# Patient Record
Sex: MALE | Race: WHITE | Employment: OTHER | URBAN - NONMETROPOLITAN AREA
[De-identification: names, ages, dates, MRNs, and addresses within clinical notes are randomized per-mention and may not be internally consistent; named-entity substitution may affect disease eponyms.]

---

## 2019-04-09 ENCOUNTER — OFFICE VISIT (OUTPATIENT)
Dept: FAMILY MEDICINE | Facility: OTHER | Age: 64
End: 2019-04-09
Payer: COMMERCIAL

## 2019-04-09 VITALS
BODY MASS INDEX: 46.03 KG/M2 | HEIGHT: 64 IN | DIASTOLIC BLOOD PRESSURE: 92 MMHG | SYSTOLIC BLOOD PRESSURE: 144 MMHG | RESPIRATION RATE: 20 BRPM | OXYGEN SATURATION: 99 % | TEMPERATURE: 96 F | WEIGHT: 269.6 LBS | HEART RATE: 80 BPM

## 2019-04-09 DIAGNOSIS — J30.1 SEASONAL ALLERGIC RHINITIS DUE TO POLLEN: Primary | ICD-10-CM

## 2019-04-09 PROCEDURE — 99203 OFFICE O/P NEW LOW 30 MIN: CPT | Performed by: PHYSICIAN ASSISTANT

## 2019-04-09 RX ORDER — CETIRIZINE HYDROCHLORIDE 10 MG/1
10 TABLET ORAL DAILY
Qty: 30 TABLET | Refills: 3 | Status: SHIPPED | OUTPATIENT
Start: 2019-04-09 | End: 2019-08-09

## 2019-04-09 RX ORDER — DIPHENHYDRAMINE HCL 25 MG
TABLET ORAL
COMMUNITY

## 2019-04-09 RX ORDER — FLUTICASONE PROPIONATE 50 MCG
2 SPRAY, SUSPENSION (ML) NASAL DAILY
COMMUNITY

## 2019-04-09 ASSESSMENT — MIFFLIN-ST. JEOR: SCORE: 1920.96

## 2019-04-09 ASSESSMENT — PAIN SCALES - GENERAL: PAINLEVEL: NO PAIN (0)

## 2019-04-09 NOTE — PROGRESS NOTES
"  SUBJECTIVE:                                                    Jc Rothman is a 63 year old male who presents to clinic today for the following health issues:        ALLERGIES     Onset: years    Description:   Nasal congestion: no  Sneezing: no  Red, itchy eyes: YES    Progression of Symptoms:  worse    Accompanying Signs & Symptoms:  Cough: no  Wheezing: no  Rash: no  Sinus/facial pain: no   History:   Is it seasonal: in the spring   History of Asthma: no  Has allergy testing been done: no    Precipitating factors:   None    Alleviating factors:  None       Therapies Tried and outcome: Flonase, benadryl, no relief    Patient is a 63 year old male who presents today with concerns about allergies. He is new to the clinic, normally receiving his care through [facility]. He says that he feels like the provider he was seeing there did not know what they were talking about. He has since stopped all his medications as he feels the cost is too high. Patient had been prescribed flonase for allergies. Encouraged regular and consistent use of this product. He was under the impression that we would be administering a shot today. I explained to him that allergy immunotherapy is an option, but would require allergist consult as well as sensitivity testing before beginning any such treatments. He is not interested in this at this time.     Problem list and histories reviewed & adjusted, as indicated.  Additional history: as documented    ROS:  Constitutional, HEENT, cardiovascular, pulmonary, gi and gu systems are negative, except as otherwise noted.    OBJECTIVE:     BP (!) 144/92 (BP Location: Right arm, Patient Position: Chair, Cuff Size: Adult Large)   Pulse 80   Temp 96  F (35.6  C) (Oral)   Resp 20   Ht 1.613 m (5' 3.5\")   Wt 122.3 kg (269 lb 9.6 oz)   SpO2 99%   BMI 47.01 kg/m    Body mass index is 47.01 kg/m .  GENERAL: healthy, alert and no distress  RESP: lungs clear to auscultation - no rales, rhonchi or " wheezes  CV: regular rate and rhythm, normal S1 S2, no S3 or S4, no murmur, click or rub, no peripheral edema and peripheral pulses strong  MS: no gross musculoskeletal defects noted, no edema, relies on cane for ambulation  PSYCH: mentation appears normal and agitated         ASSESSMENT/PLAN:     1. Seasonal allergic rhinitis due to pollen  Patient will start daily antihistamine and begin environmental controls in his home to reduce impact of allergies. Continue flonase daily. Reviewed educational information and sent copy home with patient.   - cetirizine (ZYRTEC) 10 MG tablet; Take 1 tablet (10 mg) by mouth daily  Dispense: 30 tablet; Refill: 3  - ranitidine (ZANTAC) 150 MG tablet; Take 1 tablet (150 mg) by mouth 2 times daily as needed (heartburn or allergies)  Dispense: 60 tablet; Refill: 3    Follow up with clinic as needed or sooner if conditions change, worsen or fail to improve as expected.      James Howard PA-C  [facility]

## 2019-04-09 NOTE — NURSING NOTE
Health Maintenance Due   Topic Date Due     PHQ-2 Q1 YR  08/20/1967     HIV SCREEN (SYSTEM ASSIGNED)  08/20/1973     HEPATITIS C SCREENING  08/20/1973     DTAP/TDAP/TD IMMUNIZATION (1 - Tdap) 08/20/1980     LIPID SCREEN Q5 YR MALE (SYSTEM ASSIGNED)  08/20/1990     COLON CANCER SCREEN (SYSTEM ASSIGNED)  08/20/2005     ZOSTER IMMUNIZATION (1 of 2) 08/20/2005     ADVANCE DIRECTIVE PLANNING Q5 YRS  08/20/2010     INFLUENZA VACCINE (1) 09/01/2018   Susy TORRES LPN

## 2019-04-09 NOTE — PATIENT INSTRUCTIONS
1. Cetirizine 10mg daily for allergies  2. Continue the nasal 1 spray each nostril daily  3. If allergies are not well controlled with the two medications above. May add in Zantac (ranitadine) 150mg up to twice a day.       Patient Education     Controlling Allergens: In the Home  Even a clean home can be full of allergens, so take a moment to see what you can do to cut down on allergens in each room of your home. Try to avoid things like cigarette smoke and perfume. They can irritate your eyes, nose, throat, and lungs and make your allergies worse.    Buy an air purifier with a HEPA filter. Look in consumer magazines for recommendations. Avoid vaporizers and humidifiers, since they encourage mold and dust-mite growth.    Use shades or vertical blinds instead of horizontal blinds, which collect dust. Replace drapes with curtains that can be washed regularly.    Enclose mattresses, box springs, and pillows in allergy-proof casings. Use washable blankets and quilts. Avoid feather pillows, down comforters, and wool blankets.    Avoid dust-catching clutter. Have enclosed places to keep books, toys, and clothes. Keep closet doors closed.    Use washable throw rugs wherever possible, or have bare floors.    Put filters over forced-air heating vents. Change the filters regularly.    Keep your car clean. Vacuum the seats and carpets regularly. If you have air conditioning, use it instead of opening the windows.    Keep rain gutters clean. Remove leaves and debris that can grow mold.    Check stored food for spoilage and mold growth. Clean up spills right away.    Don't let wet clothing sit and grow mold. And don't hang clothes outside to dry where they can collect airborne pollen. Dry clothing immediately in a clothes dryer that's vented to the outside.    Install a fan to keep the bathroom well ventilated.        Avoid yard work and pulling weeds. These and other outdoor activities increase your exposure to pollen. If  that s not possible, wear a filter mask. When you re done, bathe, wash your hair, and change your clothes.   Date Last Reviewed: 9/1/2016 2000-2018 [employer]. [address] [place] [address], [place] [address]. All rights reserved. This information is not intended as a substitute for professional medical care. Always follow your healthcare professional's instructions.           Patient Education     Understanding Nasal Allergies  Nasal allergies (also called allergic rhinitis) are a common health problem. They may be seasonal. This means they cause symptoms only at certain times of the year. Or they may be perennial. This means they cause symptoms all year long. Other health problems, such as asthma, often occur along with allergies as well.    What is an allergic reaction?  An allergy is a reaction to a substance called an allergen. Common allergens include:    Wind-borne pollen    Mold    Dust mites    Furry and feathered animals    Cockroaches  Normally, allergens are harmless. But when a person has allergies, the body thinks they are harmful. The body then attacks allergens with antibodies. Antibodies are attached to special cells called mast cells. Allergens stick to the antibodies. This makes the mast cells release histamine and other chemicals. This is an allergic reaction. The chemicals irritate nearby nasal tissue. This causes nasal allergy symptoms.  Common nasal allergy symptoms  Allergies can cause nasal tissue to swell. This makes the air passages smaller. The nose may feel stuffed up. The nose may also make extra mucus, which can plug the nasal passages or drip out of the nose. Mucus can drip down the back of the throat (postnasal drip) as well. Sinus tissue can swell. This may cause pain and headache. Common allergy symptoms include:    Runny nose with clear, watery discharge    Stuffy nose (nasal congestion)    Drainage down your throat (postnasal drip)    Sneezing    Red, watery  eyes    Itchy nose, eyes, ears, and throat    Plugged-up ears (ear congestion)    Sore throat    Coughing    Sinus pain and swelling    Headache  It may not be allergies  Other health problems can cause symptoms like those of nasal allergies. These include:    Nonallergic rhinitis and viruses such as colds    Irritants and pollutants, such as strong odors or smoke    Certain medicines    Changes in the weather   Treatment  Your healthcare provider will evaluate you to find the cause of your symptoms then recommend treatment. If your symptoms are due to nasal allergies, your healthcare provider may prescribe nasal steroid sprays or oral antihistamines to help reduce symptoms. Avoidance of the allergen will also be suggested. You may also be referred to an allergist.   Date Last Reviewed: 10/1/2016    4144-7810 [employer]. [address] [place] [address], [place] [address]. All rights reserved. This information is not intended as a substitute for professional medical care. Always follow your healthcare professional's instructions.

## 2019-05-29 ENCOUNTER — OFFICE VISIT (OUTPATIENT)
Dept: FAMILY MEDICINE | Facility: OTHER | Age: 64
End: 2019-05-29
Payer: COMMERCIAL

## 2019-05-29 VITALS
BODY MASS INDEX: 51.4 KG/M2 | TEMPERATURE: 97.5 F | OXYGEN SATURATION: 92 % | WEIGHT: 294.8 LBS | DIASTOLIC BLOOD PRESSURE: 84 MMHG | HEART RATE: 84 BPM | RESPIRATION RATE: 20 BRPM | SYSTOLIC BLOOD PRESSURE: 138 MMHG

## 2019-05-29 DIAGNOSIS — L03.119 CELLULITIS OF LOWER EXTREMITY, UNSPECIFIED LATERALITY: Primary | ICD-10-CM

## 2019-05-29 DIAGNOSIS — R60.9 DEPENDENT EDEMA: ICD-10-CM

## 2019-05-29 DIAGNOSIS — E66.01 MORBID OBESITY (H): ICD-10-CM

## 2019-05-29 PROCEDURE — 99213 OFFICE O/P EST LOW 20 MIN: CPT | Performed by: PHYSICIAN ASSISTANT

## 2019-05-29 RX ORDER — FUROSEMIDE 20 MG
20 TABLET ORAL DAILY
Qty: 30 TABLET | Refills: 0 | Status: SHIPPED | OUTPATIENT
Start: 2019-05-29

## 2019-05-29 RX ORDER — CEPHALEXIN 500 MG/1
500 CAPSULE ORAL 2 TIMES DAILY
Qty: 20 CAPSULE | Refills: 0 | Status: SHIPPED | OUTPATIENT
Start: 2019-05-29 | End: 2019-06-08

## 2019-05-29 ASSESSMENT — PAIN SCALES - GENERAL: PAINLEVEL: WORST PAIN (10)

## 2019-05-29 NOTE — NURSING NOTE
Health Maintenance Due   Topic Date Due     HEPATITIS C SCREENING  1955     ADVANCED DIRECTIVE PLANNING  1955     COLONOSCOPY  08/20/1965     HIV SCREENING  08/20/1970     DTAP/TDAP/TD IMMUNIZATION (1 - Tdap) 08/20/1980     LIPID  08/20/1990     ZOSTER IMMUNIZATION (2 of 3) 09/09/2013     Susy TORRES LPN

## 2019-05-29 NOTE — PATIENT INSTRUCTIONS
Patient Education     Leg Swelling in Both Legs    Swelling of the feet, ankles, and legs is called edema. It is caused by excess fluid that has collected in the tissues. Extra fluid in the body settles in the lowest part because of gravity. This is why the legs and feet are most affected.  Some of the causes for edema include:    Disease of the heart like congestive heart failure    Standing or sitting for long periods of time    Infection of the feet or legs    Blood pooling in the veins of your legs (venous insufficiency)    Dilated veins in your lower leg (varicose veins)    Garters or other clothing that is tight on your legs. This will cause blood to pool in your legs because the clothing limits blood flow.    Some medicines such as hormones like birth control pills, some blood pressure medicines like calcium channel blockers (amlodipine) and steroids, some antidepressants like MAO inhibitors and tricyclics    Menstrual periods that cause you to retain fluids    Many types of renal disease    Liver failure or cirrhosis    Pregnancy, some swelling is normal, but a sudden increase in leg swelling or weight gain can be a sign of a dangerous complication of pregnancy    Poor nutrition    Thyroid disease  Medical treatment will depend on what is causing the swelling in your legs. Your healthcare provider may prescribe water pills (diuretics) to get rid of the extra fluid.  Home care  Follow these guidelines when caring for yourself at home:    Don't wear clothing like garters that is tight on your legs.    Keep your legs up while lying or sitting.    If infection, injury, or recent surgery is causing the swelling, stay off your legs as much as possible until symptoms get better.    If your healthcare provider says that your leg swelling is caused by venous insufficiency or varicose veins, don't sit or  one place for long periods of time. Take breaks and walk about every few hours. Brisk walking is a good  exercise. It helps circulate the blood that has collected in your leg. Talk with your provider about using support stockings to stop daytime leg swelling.    If your provider says that heart disease is causing your leg swelling, follow a low-salt diet to stop extra fluid from staying in your body. You may also need medicine.  Follow-up care  Follow up with your healthcare provider, or as advised.  When to seek medical advice  Call your healthcare provider right away if any of these occur:    New shortness of breath or chest pain    Shortness of breath or chest pain that gets worse    Swelling in both legs or ankles that gets worse    Swelling of the abdomen    Redness, warmth, or swelling in one leg    Fever of 100.4 F (38 C) or higher, or as directed by your healthcare provider    Yellow color to your skin or eyes    Rapid, unexplained weight gain    Having to sleep upright or use an increased number of pillows  Date Last Reviewed: 3/31/2016    6588-1093 [employer].. [address] [place] [address], [place] [address]. All rights reserved. This information is not intended as a substitute for professional medical care. Always follow your healthcare professional's instructions.

## 2019-05-29 NOTE — PROGRESS NOTES
Subjective     Jc Rothman is a 63 year old male who presents to clinic today for the following health issues:    HPI   Concern - rash on bilateral lower leg  Onset: 5 years    Description:   rash    Intensity: severe    Progression of Symptoms:  same    Accompanying Signs & Symptoms:  itching    Previous history of similar problem:   YES    Precipitating factors:   Worsened by: nothing    Alleviating factors:  Improved by: nothing    Therapies Tried and outcome: Triamcinolone, alcohol, peroxide, no relief    Patient is a 63 year old male who presents with ongoing concerns about lower extremity edema, itching and pain. Patient receives majority of his care through [facility], but says that the providers are not helpful there. He is obese and his legs are quite edematous with nodular growths. There is visible weeping and red color noted throughout. Patient is unsure as to whether elevation helps and has not been using compression stockings in some time. Consulted with IM on whether dermatologist consult would be beneficial.     Reviewed and updated as needed this visit by Provider         Review of Systems   ROS COMP: Constitutional, HEENT, cardiovascular, pulmonary, gi and gu systems are negative, except as otherwise noted.      Objective    /84 (BP Location: Right arm, Patient Position: Chair, Cuff Size: Adult Large)   Pulse 84   Temp 97.5  F (36.4  C) (Oral)   Resp 20   Wt 133.7 kg (294 lb 12.8 oz)   SpO2 92%   BMI 51.40 kg/m    Body mass index is 51.4 kg/m .  Physical Exam   GENERAL: healthy, alert and no distress  RESP: lungs clear to auscultation - no rales, rhonchi or wheezes  CV: regular rate and rhythm, normal S1 S2, no S3 or S4, no murmur, click or rub, no peripheral edema and peripheral pulses strong  MS: no gross musculoskeletal defects noted, pitting edema bilaterally in lower legs with visible weeping and rubor coloration  SKIN: nodular growths over lower legs due to chronic edema, some  "weeping and fissured skin, no ulcers present, warm to touch, nontender  NEURO: Normal strength and tone, mentation intact and speech normal            Assessment & Plan     1. Cellulitis of lower extremity, unspecified laterality  Patient will start antibiotic for possible cellulitis in lower legs. Discussed with IM who agreed that derm consult would be beneficial. Patient declined, stating that he does not like to drive long distances.   - cephALEXin (KEFLEX) 500 MG capsule; Take 1 capsule (500 mg) by mouth 2 times daily for 10 days  Dispense: 20 capsule; Refill: 0    2. Dependent edema  May use lasix as needed for increased edema. Reviewed importance of use of compression stockings, elevation and low salt diet. Educational information sent home with patient.   - furosemide (LASIX) 20 MG tablet; Take 1 tablet (20 mg) by mouth daily  Dispense: 30 tablet; Refill: 0    3. Morbid obesity (H)  Patient was encouraged to lose weight as this will benefit his edema.        BMI:   Estimated body mass index is 51.4 kg/m  as calculated from the following:    Height as of 4/9/19: 1.613 m (5' 3.5\").    Weight as of this encounter: 133.7 kg (294 lb 12.8 oz).   Weight management plan: Discussed healthy diet and exercise guidelines        Follow up with PCP for annual checkup or sooner if conditions change, worsen or fail to improve as expected.      No follow-ups on file.    James Howard PA-C  [facility]      "

## 2019-06-19 ENCOUNTER — TELEPHONE (OUTPATIENT)
Dept: FAMILY MEDICINE | Facility: OTHER | Age: 64
End: 2019-06-19

## 2019-06-19 NOTE — TELEPHONE ENCOUNTER
Panel Management Review      Patient has the following on his problem list: None      Composite cancer screening  Chart review shows that this patient is due/due soon for the following Colonoscopy  Summary:    Patient is due/failing the following:   COLONOSCOPY and LDL    Action needed:   Patient needs referral/order: Colonoscopy and LDL    Type of outreach:    Sent letter.    Questions for provider review:    None                                                                                                                                    Cesilia Penn MA

## 2019-06-19 NOTE — LETTER
[facility]  [address] [facility] [id number] [phone]        Jc Rothman  [address] [place] [place] [facility] [address]      June 19, 2019      Dear Jc,    I care about your health and have reviewed your health plan, including your medical conditions, medication list, and lab results and am making recommendations based on this review, to better manage your health.    You are in particular need of attention regarding:  -Colon Cancer Screening  -LDL    I am recommending that you:  -schedule a LAB ONLY APPOINTMENT to recheck your: Lipids (fasting cholesterol - nothing to eat except water and/or meds for 8+ hours) tests within the next 1-4 weeks.  -schedule a COLONOSCOPY.  Colon cancer is now the second leading cause of cancer-related deaths in the [place] for both men and women.  There are over 130,000 new cases and 50,000 deaths per year from colon cancer.  A recent study, which included patients ages 55 to 79 found 50,400 [place] deaths from colorectal cancer could have been prevented if patients had undergone a colonoscopy in the previous 10 years.    If you have not had a colonoscopy, we encourage you to schedule by contacting us at [phone], Monday through Friday.  After hours, you may leave a message and we will return your call during normal business hours.      There is another option called a FIT test, if you don t wish to have a colonoscopy, which needs to be repeated every year.  It does replace the colonoscopy for colorectal cancer screening and can detect hidden bleeding in the lower colon.  If a positive result is obtained, you would be referred for a colonoscopy. Please discuss this option with your provider.      For patients under/uninsured, we recommend you contact the [employer] Scopes program. [employer] Scopes is a free colorectal cancer screening program that provides colonoscopies for eligible under/uninsured [place] men and women. If you are interested in  receiving a free colonoscopy, please call [employer] at [phone] (mention code ScopesWeb) to see if you re eligible.     If you've had the preventative screening completed at another facility or feel you're not due for this screening, please call our clinic at the number listed above or send us a My Chart message so we can update our records. We would like to thank you in advance for taking the time to take care of your health.  If you have any questions, please don t hesitate to contact our clinic.    Sincerely,       Your [facility] Team

## 2019-07-29 DIAGNOSIS — J30.1 SEASONAL ALLERGIC RHINITIS DUE TO POLLEN: ICD-10-CM

## 2019-07-30 NOTE — TELEPHONE ENCOUNTER
"Requested Prescriptions   Pending Prescriptions Disp Refills     ranitidine (ZANTAC) 150 MG tablet [Pharmacy Med Name: RANITIDINE 150 MG TABLET GLN] 60 tablet 3     Sig: TAKE 1 TABLET TWO TIMES DAILY AS NEEDED (HEARTBURN OR ALLERGIES)   Last Written Prescription Date:  4/19/2019  Last Fill Quantity: 60,  # refills: 3   Last office visit: 5/29/2019 with prescribing provider:     Future Office Visit:        H2 Blockers Protocol Passed - 7/29/2019  9:44 AM        Passed - Patient is age 12 or older        Passed - Recent (12 mo) or future (30 days) visit within the authorizing provider's specialty     Patient had office visit in the last 12 months or has a visit in the next 30 days with authorizing provider or within the authorizing provider's specialty.  See \"Patient Info\" tab in inbasket, or \"Choose Columns\" in Meds & Orders section of the refill encounter.              Passed - Medication is active on med list        Prescription approved per [facility] Refill Protocol.  Maria D Hughes RN    "

## 2019-08-09 DIAGNOSIS — J30.1 SEASONAL ALLERGIC RHINITIS DUE TO POLLEN: ICD-10-CM

## 2019-08-12 RX ORDER — CETIRIZINE HYDROCHLORIDE 10 MG/1
10 TABLET ORAL DAILY
Qty: 90 TABLET | Refills: 0 | Status: SHIPPED | OUTPATIENT
Start: 2019-08-12

## 2019-08-12 NOTE — TELEPHONE ENCOUNTER
"Requested Prescriptions   Pending Prescriptions Disp Refills     cetirizine (ZYRTEC) 10 MG tablet [Pharmacy Med Name: CETIRIZINE HCL 10 MG TABLETOHM] 30 tablet 3     Sig: TAKE ONE TABLET DAILY.   Last Written Prescription Date:  4/9/19  Last Fill Quantity: 30,  # refills: 3   Last office visit: 5/29/2019 with prescribing provider:     Future Office Visit:        Antihistamines Protocol Passed - 8/9/2019 11:27 AM        Passed - Patient is 3-64 years of age     Apply weight-based dosing for peds patients age 3 - 12 years of age.    Forward request to provider for patients under the age of 3 or over the age of 64.          Passed - Recent (12 mo) or future (30 days) visit within the authorizing provider's specialty     Patient had office visit in the last 12 months or has a visit in the next 30 days with authorizing provider or within the authorizing provider's specialty.  See \"Patient Info\" tab in inbasket, or \"Choose Columns\" in Meds & Orders section of the refill encounter.              Passed - Medication is active on med list          "